# Patient Record
Sex: FEMALE | Race: WHITE | URBAN - METROPOLITAN AREA
[De-identification: names, ages, dates, MRNs, and addresses within clinical notes are randomized per-mention and may not be internally consistent; named-entity substitution may affect disease eponyms.]

---

## 2017-06-14 ENCOUNTER — OFFICE VISIT (OUTPATIENT)
Dept: FAMILY MEDICINE CLINIC | Facility: CLINIC | Age: 22
End: 2017-06-14

## 2017-06-14 VITALS
WEIGHT: 115 LBS | SYSTOLIC BLOOD PRESSURE: 108 MMHG | DIASTOLIC BLOOD PRESSURE: 84 MMHG | HEIGHT: 63 IN | RESPIRATION RATE: 16 BRPM | BODY MASS INDEX: 20.37 KG/M2 | TEMPERATURE: 98 F | OXYGEN SATURATION: 99 % | HEART RATE: 89 BPM

## 2017-06-14 DIAGNOSIS — Z20.828 MONO EXPOSURE: ICD-10-CM

## 2017-06-14 DIAGNOSIS — B27.90 MONONUCLEOSIS: Primary | ICD-10-CM

## 2017-06-14 PROCEDURE — 99202 OFFICE O/P NEW SF 15 MIN: CPT | Performed by: NURSE PRACTITIONER

## 2017-06-14 PROCEDURE — 86308 HETEROPHILE ANTIBODY SCREEN: CPT | Performed by: NURSE PRACTITIONER

## 2017-06-14 NOTE — PROGRESS NOTES
CHIEF COMPLAINT:   Patient presents with:  Cough/URI: x 1 day wants a mono test      HPI:   Mary Donato is a 23year old female who presents for upper respiratory symptoms/ tiredness for  1 days. Patient reports fatigue x 1 day with pos mono exposure. EXTREMITIES: no cyanosis, clubbing or edema  LYMPH:  no lymphadenopathy.         ASSESSMENT AND PLAN:   Katie Tatum is a 23year old female who presents with upper respiratory symptoms that are consistent with    ASSESSMENT:   Mono exposure  Mononucleos Note: Taking the antibiotics ampicillin or amoxicillin during a mono infection may cause a skin rash. This is not serious and will fade in about one week. The cause is a reaction of the drug with the virus. Note: Mono can cause your spleen to swell.  The s Get emergency medical care if any of the following occur:  · Severe or worsening abdominal pain  · Trouble breathing  Date Last Reviewed: 9/25/2015  © 1081-4798 [employer] [facility], [address] [place] [address] [employer]. All rights reserved.  This

## 2017-06-14 NOTE — PATIENT INSTRUCTIONS
Mononucleosis  Mononucleosis (also called mono) is a contagious viral infection. Most infants and children exposed to the virus get only mild flu-like symptoms or no symptoms at all. However, infection is usually more serious in teens and young adults.  Manan Courtney · Drink plenty of fluids, but avoid alcohol. Otherwise, you may eat a regular diet. · Ask your healthcare provider about using over-the-counter medicines to treat symptoms such as fever, pain, or an itchy rash.   · Over-the-counter throat lozenges may help

## (undated) NOTE — MR AVS SNAPSHOT
[facility]  [address] [facility] [id number] [phone]               Thank you for choosing us for your health care visit with LOGAN Olivo.   We are glad to serve you and happy to provide you with this summary of y away in about 1 to 2 months. But they can last up to four months.   If symptoms have been present less than 1 week or more than 3 weeks, the blood test used to diagnose this disease may be negative even though you have the illness.  In this case, other test extreme fatigue and swollen glands last longer than 6 months, see your healthcare provider for further testing.   When to seek medical advice  Call your healthcare provider if any of the following occur:  · Excessive coughing  · Yellow skin or eyes  ·  Trou